# Patient Record
Sex: FEMALE | Race: WHITE | NOT HISPANIC OR LATINO | URBAN - METROPOLITAN AREA
[De-identification: names, ages, dates, MRNs, and addresses within clinical notes are randomized per-mention and may not be internally consistent; named-entity substitution may affect disease eponyms.]

---

## 2018-05-18 ENCOUNTER — EMERGENCY (EMERGENCY)
Facility: HOSPITAL | Age: 53
LOS: 1 days | Discharge: HOME | End: 2018-05-18
Attending: EMERGENCY MEDICINE
Payer: COMMERCIAL

## 2018-05-18 VITALS
TEMPERATURE: 97 F | OXYGEN SATURATION: 98 % | HEART RATE: 121 BPM | HEIGHT: 64 IN | SYSTOLIC BLOOD PRESSURE: 128 MMHG | WEIGHT: 102.07 LBS | DIASTOLIC BLOOD PRESSURE: 61 MMHG | RESPIRATION RATE: 18 BRPM

## 2018-05-18 PROCEDURE — 99283 EMERGENCY DEPT VISIT LOW MDM: CPT

## 2018-05-18 PROCEDURE — 99283 EMERGENCY DEPT VISIT LOW MDM: CPT | Mod: 25

## 2018-05-18 PROCEDURE — 90715 TDAP VACCINE 7 YRS/> IM: CPT

## 2018-05-18 PROCEDURE — 90471 IMMUNIZATION ADMIN: CPT

## 2018-05-18 RX ORDER — TETANUS TOXOID, REDUCED DIPHTHERIA TOXOID AND ACELLULAR PERTUSSIS VACCINE, ADSORBED 5; 2.5; 8; 8; 2.5 [IU]/.5ML; [IU]/.5ML; UG/.5ML; UG/.5ML; UG/.5ML
0.5 SUSPENSION INTRAMUSCULAR ONCE
Qty: 0 | Refills: 0 | Status: COMPLETED | OUTPATIENT
Start: 2018-05-18 | End: 2018-05-18

## 2018-05-18 RX ORDER — OXYCODONE AND ACETAMINOPHEN 5; 325 MG/1; MG/1
1 TABLET ORAL ONCE
Qty: 0 | Refills: 0 | Status: DISCONTINUED | OUTPATIENT
Start: 2018-05-18 | End: 2018-05-18

## 2018-05-18 RX ORDER — MIRABEGRON 50 MG/1
1 TABLET, EXTENDED RELEASE ORAL
Qty: 0 | Refills: 0 | COMMUNITY

## 2018-05-18 RX ADMIN — Medication 1 TABLET(S): at 17:13

## 2018-05-18 RX ADMIN — OXYCODONE AND ACETAMINOPHEN 1 TABLET(S): 5; 325 TABLET ORAL at 17:13

## 2018-05-18 RX ADMIN — OXYCODONE AND ACETAMINOPHEN 1 TABLET(S): 5; 325 TABLET ORAL at 17:45

## 2018-05-18 RX ADMIN — TETANUS TOXOID, REDUCED DIPHTHERIA TOXOID AND ACELLULAR PERTUSSIS VACCINE, ADSORBED 0.5 MILLILITER(S): 5; 2.5; 8; 8; 2.5 SUSPENSION INTRAMUSCULAR at 17:13

## 2018-05-18 NOTE — ED PROVIDER NOTE - OBJECTIVE STATEMENT
pt c/o dog bite to right thigh from friend's dog this afternoon. dog's rabies utd. pt unsure of last tetanus. pt denies any other injuries.  pmrosy - goldberg

## 2018-05-18 NOTE — ED ADULT TRIAGE NOTE - CHIEF COMPLAINT QUOTE
Attacked by beagle - injuries noted to to right upper thigh. Per patient, owners of dog state the dog is up to date with it's immunizations.

## 2018-05-18 NOTE — ED ADULT NURSE NOTE - OBJECTIVE STATEMENT
Pt. received alert and oriented x4 with chief complaint of being bitten by dog to right upper leg. Pt. presents w/ right anterior lacerations to right upper leg. Pt. states dog is updated on all shots.

## 2020-01-19 ENCOUNTER — FORM ENCOUNTER (OUTPATIENT)
Age: 55
End: 2020-01-19

## 2021-01-21 NOTE — ED PROVIDER NOTE - QUALITY
Attempted to contact the patient x3 for post-hospital call without answer. Will close encounter at this time.    Monika Raman CMA, [facility]  Post Hospital Discharge Team       sharp

## 2022-06-01 PROBLEM — N32.81 OVERACTIVE BLADDER: Chronic | Status: ACTIVE | Noted: 2018-05-18

## 2022-06-03 ENCOUNTER — OUTPATIENT (OUTPATIENT)
Dept: OUTPATIENT SERVICES | Facility: HOSPITAL | Age: 57
LOS: 1 days | End: 2022-06-03
Payer: COMMERCIAL

## 2022-06-03 ENCOUNTER — APPOINTMENT (OUTPATIENT)
Dept: MRI IMAGING | Facility: CLINIC | Age: 57
End: 2022-06-03
Payer: COMMERCIAL

## 2022-06-03 DIAGNOSIS — Z00.8 ENCOUNTER FOR OTHER GENERAL EXAMINATION: ICD-10-CM

## 2022-06-03 PROCEDURE — A9585: CPT

## 2022-06-03 PROCEDURE — 70553 MRI BRAIN STEM W/O & W/DYE: CPT

## 2022-06-03 PROCEDURE — 70553 MRI BRAIN STEM W/O & W/DYE: CPT | Mod: 26

## 2022-07-01 ENCOUNTER — OUTPATIENT (OUTPATIENT)
Dept: OUTPATIENT SERVICES | Facility: HOSPITAL | Age: 57
LOS: 1 days | End: 2022-07-01
Payer: COMMERCIAL

## 2022-07-01 ENCOUNTER — APPOINTMENT (OUTPATIENT)
Dept: RADIOLOGY | Facility: HOSPITAL | Age: 57
End: 2022-07-01

## 2022-07-01 DIAGNOSIS — R13.14 DYSPHAGIA, PHARYNGOESOPHAGEAL PHASE: ICD-10-CM

## 2022-07-01 DIAGNOSIS — R13.10 DYSPHAGIA, UNSPECIFIED: ICD-10-CM

## 2022-07-01 PROCEDURE — 74221 X-RAY XM ESOPHAGUS 2CNTRST: CPT

## 2022-07-01 PROCEDURE — 74221 X-RAY XM ESOPHAGUS 2CNTRST: CPT | Mod: 26

## 2022-08-12 ENCOUNTER — APPOINTMENT (OUTPATIENT)
Dept: OBGYN | Facility: CLINIC | Age: 57
End: 2022-08-12

## 2022-08-12 VITALS
HEART RATE: 67 BPM | BODY MASS INDEX: 17.15 KG/M2 | SYSTOLIC BLOOD PRESSURE: 120 MMHG | RESPIRATION RATE: 12 BRPM | DIASTOLIC BLOOD PRESSURE: 78 MMHG | WEIGHT: 98 LBS | HEIGHT: 63.5 IN | OXYGEN SATURATION: 98 %

## 2022-08-12 DIAGNOSIS — Z13.31 ENCOUNTER FOR SCREENING FOR DEPRESSION: ICD-10-CM

## 2022-08-12 DIAGNOSIS — Z83.438 FAMILY HISTORY OF OTHER DISORDER OF LIPOPROTEIN METABOLISM AND OTHER LIPIDEMIA: ICD-10-CM

## 2022-08-12 DIAGNOSIS — Z83.3 FAMILY HISTORY OF DIABETES MELLITUS: ICD-10-CM

## 2022-08-12 DIAGNOSIS — Z80.0 FAMILY HISTORY OF MALIGNANT NEOPLASM OF DIGESTIVE ORGANS: ICD-10-CM

## 2022-08-12 DIAGNOSIS — Z82.49 FAMILY HISTORY OF ISCHEMIC HEART DISEASE AND OTHER DISEASES OF THE CIRCULATORY SYSTEM: ICD-10-CM

## 2022-08-12 DIAGNOSIS — Z78.9 OTHER SPECIFIED HEALTH STATUS: ICD-10-CM

## 2022-08-12 DIAGNOSIS — Z72.89 OTHER PROBLEMS RELATED TO LIFESTYLE: ICD-10-CM

## 2022-08-12 DIAGNOSIS — Z12.11 ENCOUNTER FOR SCREENING FOR MALIGNANT NEOPLASM OF COLON: ICD-10-CM

## 2022-08-12 DIAGNOSIS — Z12.4 ENCOUNTER FOR SCREENING FOR MALIGNANT NEOPLASM OF CERVIX: ICD-10-CM

## 2022-08-12 DIAGNOSIS — Z01.419 ENCOUNTER FOR GYNECOLOGICAL EXAMINATION (GENERAL) (ROUTINE) W/OUT ABNORMAL FINDINGS: ICD-10-CM

## 2022-08-12 DIAGNOSIS — Z12.39 ENCOUNTER FOR OTHER SCREENING FOR MALIGNANT NEOPLASM OF BREAST: ICD-10-CM

## 2022-08-12 DIAGNOSIS — Z83.2 FAMILY HISTORY OF DISEASES OF THE BLOOD AND BLOOD-FORMING ORGANS AND CERTAIN DISORDERS INVOLVING THE IMMUNE MECHANISM: ICD-10-CM

## 2022-08-12 LAB
DATE COLLECTED: NORMAL
HEMOCCULT SP1 STL QL: NEGATIVE

## 2022-08-12 PROCEDURE — 99396 PREV VISIT EST AGE 40-64: CPT | Mod: 25

## 2022-08-12 RX ORDER — FEXOFENADINE HCL 60 MG
TABLET ORAL
Refills: 0 | Status: ACTIVE | COMMUNITY

## 2022-08-12 RX ORDER — HYOSCYAMINE SULFATE 0.12 MG/1
0.12 TABLET SUBLINGUAL
Qty: 30 | Refills: 0 | Status: ACTIVE | COMMUNITY
Start: 2022-05-16

## 2022-08-12 RX ORDER — SOLIFENACIN SUCCINATE 5 MG/1
5 TABLET ORAL
Qty: 90 | Refills: 0 | Status: ACTIVE | COMMUNITY
Start: 2022-07-05

## 2022-08-12 RX ORDER — ACETAMINOPHEN/DIPHENHYDRAMINE 325MG-50MG
TABLET ORAL
Refills: 0 | Status: ACTIVE | COMMUNITY

## 2022-08-12 RX ORDER — SOLIFENACIN SUCCINATE 10 MG/1
TABLET ORAL
Refills: 0 | Status: ACTIVE | COMMUNITY

## 2022-08-12 NOTE — HISTORY OF PRESENT ILLNESS
[N] : Patient does not use contraception [Y] : Positive pregnancy history [Yes] : pregnancy [Hot Flashes] : hot flashes [Night Sweats] : night sweats [Regular Cycle Intervals] : periods have been regular [Menarche Age: ____] : age at menarche was [unfilled] [Currently In Menopause] : currently in menopause [Experiencing Menopausal Sxs] : experiencing menopausal symptoms [Currently Active] : currently active [Men] : men [No] : No [TextBox_4] : annual [Mammogramdate] : 08/22 [BreastSonogramDate] : 08/22 [TextBox_31] : unknown [BoneDensityDate] : 01/18 [TextBox_78] : no HX [ColonoscopyDate] : 01/20 [LMPDate] : 06/2019 [PGHxTotal] : 2 [TextBox_6] : 06/2019 [TextBox_9] : 15 [FreeTextEntry1] : 06/2019

## 2022-08-14 LAB
HPV 16 E6+E7 MRNA CVX QL NAA+PROBE: NOT DETECTED
HPV18+45 E6+E7 MRNA CVX QL NAA+PROBE: NOT DETECTED

## 2022-08-22 LAB — CYTOLOGY CVX/VAG DOC THIN PREP: ABNORMAL

## 2023-05-22 NOTE — ED ADULT NURSE NOTE - CAS DISCH ACCOMP BY
Last Appt: 4-19-23        Last refill: Temazepam  12-4-22  #90 x 1      Last lab: 4-13-23      Next appt: 10-18-23    
Spouse

## 2023-12-08 NOTE — ED ADULT TRIAGE NOTE - STATUS:
Applied pt c/o productive cough with white sputum, sob, stuffy nose, headache, sore throat x 2 weeks. hx: asthma

## 2024-06-26 NOTE — ED PROVIDER NOTE - SKIN, MLM
"Last OV: 12.07.2023  Proposed Surgery: EGD w/ deep sedation   Surgery Date: TBD   Requesting Office Name: GI   Fax Number: [phone]  Preference of Location (default is surgery center unless specified by Cardiologist or [facility])  Prior Clearance Addressed: No      Anticoags/Antiplatelets: Aspirin  Anticoags/Antiplatelet managed by Cardiology? YES    Outstanding Cardiac Imaging : No  Stent, Cardiac Devices, or Catheterization: Yes  Date : 07.10.2023   Ablation, TAVR/Valve (including open heart), Cardioversion: No  Recent Cardiac Hospitalization: No            When: N/A  History (cardiac history):   Past Medical History:   Diagnosis Date    Anemia     Breast cancer (HCC) 02/27/2012    Cancer (HCC) 2010    right breast    DM hyperosmolarity type II (HCC) 02/27/2012    oral meds    DM II (diabetes mellitus, type II), controlled (HCC) 06/21/2012    Hiatus hernia syndrome     High cholesterol 02/10/2015    Pt denies    HTN (hypertension) 02/12/2015    more \"preventative\" for DM-per her     Hyperlipidemia 02/27/2012    Hypothyroidism 02/27/2012    Liver cirrhosis (HCC)     couldn't take statins    Malignant neoplasm of upper-inner quadrant of right breast in female, estrogen receptor positive (HCC) 03/13/2018    Pancytopenia (HCC)     Vitamin D deficiency 05/27/2014    ICD-10 transition             Surgical Clearance Letter Sent: YES   **Scan clearance request letter into [employer].**   " Skin normal color for race, warm, dry. right thigh punctures at bite site